# Patient Record
Sex: MALE | Race: ASIAN
[De-identification: names, ages, dates, MRNs, and addresses within clinical notes are randomized per-mention and may not be internally consistent; named-entity substitution may affect disease eponyms.]

---

## 2019-09-12 ENCOUNTER — HOSPITAL ENCOUNTER (EMERGENCY)
Dept: HOSPITAL 72 - EMR | Age: 79
Discharge: HOME | End: 2019-09-12
Payer: COMMERCIAL

## 2019-09-12 VITALS — WEIGHT: 135 LBS | HEIGHT: 65 IN | BODY MASS INDEX: 22.49 KG/M2

## 2019-09-12 VITALS — DIASTOLIC BLOOD PRESSURE: 78 MMHG | SYSTOLIC BLOOD PRESSURE: 136 MMHG

## 2019-09-12 VITALS — SYSTOLIC BLOOD PRESSURE: 136 MMHG | DIASTOLIC BLOOD PRESSURE: 78 MMHG

## 2019-09-12 DIAGNOSIS — R33.8: Primary | ICD-10-CM

## 2019-09-12 DIAGNOSIS — I10: ICD-10-CM

## 2019-09-12 DIAGNOSIS — N40.1: ICD-10-CM

## 2019-09-12 PROCEDURE — 51702 INSERT TEMP BLADDER CATH: CPT

## 2019-09-12 PROCEDURE — 99284 EMERGENCY DEPT VISIT MOD MDM: CPT

## 2019-09-12 NOTE — EMERGENCY ROOM REPORT
History of Present Illness


General


Chief Complaint:  Male Urogenital Problems


Source:  Patient, Family Member





Present Illness


HPI


79-year-old male presents ED for evaluation.  States he has been unable to 

urinate since last night.  Denies any abdominal pain.  Denies flank pain.  

History of BPH.  States he had a Marquez catheter which was removed last week by 

his PMD.  States he is compliant with his medications.  No other aggravating 

relieving factors.  Denies any other associated symptoms


Allergies:  


Coded Allergies:  


     No Known Allergies (Unverified , 9/12/19)





Patient History


Past Medical History:  HTN, other - BPH


Past Surgical History:  none


Pertinent Family History:  none


Social History:  Denies: smoking, alcohol use, drug use


Immunizations:  UTD


Reviewed Nursing Documentation:  PMH: Agreed; PSxH: Agreed





Nursing Documentation-PMH


Hx Hypertension:  Yes





Review of Systems


All Other Systems:  negative except mentioned in HPI





Physical Exam





Vital Signs








  Date Time  Temp Pulse Resp B/P (MAP) Pulse Ox O2 Delivery O2 Flow Rate FiO2


 


9/12/19 04:22 97.7 67 16 153/73 (99) 96 Room Air  








Sp02 EP Interpretation:  reviewed, normal


General Appearance:  no apparent distress, alert, GCS 15, non-toxic


Head:  normocephalic


Eyes:  bilateral eye normal inspection, bilateral eye PERRL


ENT:  normal ENT inspection


Neck:  normal inspection


Respiratory:  chest non-tender, lungs clear, normal breath sounds, speaking 

full sentences


Cardiovascular #1:  regular rate, rhythm, no edema


Gastrointestinal:  normal bowel sounds, non tender, soft, non-distended, no 

guarding, no rebound


Rectal:  deferred


Genitourinary:  no CVA tenderness


Musculoskeletal:  normal inspection


Neurologic:  alert, oriented x3, responsive, motor strength/tone normal, 

sensory intact, speech normal


Psychiatric:  normal inspection


Skin:  no rash


Lymphatic:  normal inspection





Medical Decision Making


Diagnostic Impression:  


 Primary Impression:  


 Urinary retention


ER Course


Hospital Course 


79-year-old M presents to ED complaining of urinary retention.  h/o BPH





Differential diagnoses include: obstruction, UTI, BPH 





Clinical course


Patient placed on stretcher.  After initial history and physical I ordered 

marquez cather with immediate relief of obstruction 





On reassessment patient feels better.  Will discharge to home.  States he will 

take his medications as prescribed.  States he will follow-up with his 

urologist.





Diagnosis - urinary retention 





Stable and discharged home with marquez + leg bag.  Instructed to followup with 

PMD/urologist.  Return to ED if symptoms recur or worsen





Last Vital Signs








  Date Time  Temp Pulse Resp B/P (MAP) Pulse Ox O2 Delivery O2 Flow Rate FiO2


 


9/12/19 05:10 97.7 62 16 136/78 96 Room Air  








Status:  improved


Disposition:  HOME, SELF-CARE


Condition:  Stable


Referrals:  


Jeffery Dorsey MD, Sameer M.D.











NON PHYSICIAN (PCP)


Patient Instructions:  Marquez Catheter Care, Adult, Easy-to-Read











Broderick Li MD Sep 12, 2019 06:49

## 2019-09-12 NOTE — NUR
ED Nurse Note:



marquez cath 16 fr inserted per ERMD order via sterile technique, pt tolerated 
well, 10cc ns inserted. 30 cc yellow urine returned.

## 2019-09-12 NOTE — NUR
ED Nurse Note:







pt cleared to be d/c per ERMD, pt discharge and aftercare instruction provided, 
pt provided w/ extra leg urinary bag, pt advised to follow up with pcp or 
return to ed if changes in condition, vss, ambulatory w/ steady gait, left w/ 
all belongings accompanied by spouse member, marquez intact, education done.

## 2019-09-12 NOTE — NUR
ED Nurse Note:





pt walked in c/o difficulty urination since last night, pt reports he has hx 
BPH, pt reports last time he had same issue he went to [facility] and had catether for one week. ERMD notified. noted tenderness over 
lower abd.

## 2020-01-04 ENCOUNTER — HOSPITAL ENCOUNTER (EMERGENCY)
Dept: HOSPITAL 72 - EMR | Age: 80
Discharge: HOME | End: 2020-01-04
Payer: MEDICARE

## 2020-01-04 VITALS — HEIGHT: 61 IN | WEIGHT: 134 LBS | BODY MASS INDEX: 25.3 KG/M2

## 2020-01-04 VITALS — SYSTOLIC BLOOD PRESSURE: 176 MMHG | DIASTOLIC BLOOD PRESSURE: 74 MMHG

## 2020-01-04 VITALS — DIASTOLIC BLOOD PRESSURE: 74 MMHG | SYSTOLIC BLOOD PRESSURE: 176 MMHG

## 2020-01-04 DIAGNOSIS — R33.9: Primary | ICD-10-CM

## 2020-01-04 DIAGNOSIS — I10: ICD-10-CM

## 2020-01-04 PROCEDURE — 51702 INSERT TEMP BLADDER CATH: CPT

## 2020-01-04 PROCEDURE — 99284 EMERGENCY DEPT VISIT MOD MDM: CPT

## 2020-01-04 PROCEDURE — 99282 EMERGENCY DEPT VISIT SF MDM: CPT

## 2020-01-04 NOTE — EMERGENCY ROOM REPORT
History of Present Illness


General


Chief Complaint:  Male Urogenital Problems


Source:  Patient, Medical Record





Present Illness


HPI


This is a 79-year-old Thai speaking male with a history of high blood 

pressure and BPH.  He presents with complaint of urinary retention.  Onset for 

several hours now.  Unable to urinate.  Very painful.  Very distended.  Similar 

symptom last year.  He denies any fever chills.  No nausea no vomiting.  

Nothing made it better.  Palpation and try to urinate made it worse.


Allergies:  


Coded Allergies:  


     No Known Allergies (Unverified , 9/12/19)





Patient History


Past Medical History:  see triage record, old chart reviewed, HTN


Past Surgical History:  other


Pertinent Family History:  none


Social History:  Denies: smoking


Immunizations:  other


Reviewed Nursing Documentation:  PMH: Agreed; PSxH: Agreed





Nursing Documentation-PMH


Past Medical History:  No Stated History


Hx Hypertension:  Yes





Review of Systems


Eye:  Denies: eye pain, blurred vision


ENT:  Denies: ear pain, nose congestion, throat swelling


Respiratory:  Denies: cough, shortness of breath


Cardiovascular:  Denies: chest pain, palpitations


Gastrointestinal:  Denies: abdominal pain, diarrhea, nausea, vomiting


Genitourinary:  Reports: retention


Musculoskeletal:  Denies: back pain, joint pain


Skin:  Denies: rash


Neurological:  Denies: headache, numbness


Endocrine:  Denies: increased thirst, increased urine


Hematologic/Lymphatic:  Denies: easy bruising


All Other Systems:  negative except mentioned in HPI





Physical Exam





Vital Signs








  Date Time  Temp Pulse Resp B/P (MAP) Pulse Ox O2 Delivery O2 Flow Rate FiO2


 


1/4/20 03:39 98.1 61 18 176/74 (108) 98 Room Air  





Vitals with high blood pressure


Sp02 EP Interpretation:  reviewed, normal


General Appearance:  well appearing, no apparent distress, alert


Head:  normocephalic, atraumatic


Eyes:  bilateral eye PERRL, bilateral eye EOMI


ENT:  hearing grossly normal, normal pharynx


Neck:  full range of motion, supple, no meningismus


Respiratory:  chest non-tender, lungs clear, normal breath sounds


Cardiovascular #1:  regular rate, rhythm, no murmur


Gastrointestinal:  normal bowel sounds, non tender, no mass, no organomegaly, 

no bruit, non-distended, other - Distended bladder


Musculoskeletal:  back normal, normal range of motion, gait/station normal


Psychiatric:  mood/affect normal





Medical Decision Making


Diagnostic Impression:  


 Primary Impression:  


 Urinary retention


ER Course


Resents with acute urinary retention.  Blood pressure better after Robles 

placed.  Output about 650 cc





Last Vital Signs








  Date Time  Temp Pulse Resp B/P (MAP) Pulse Ox O2 Delivery O2 Flow Rate FiO2


 


1/4/20 03:39 98.1 61 18 176/74 (108) 98 Room Air  








Status:  improved


Disposition:  HOME, SELF-CARE


Condition:  Stable


Scripts


Tamsulosin HCl (Flomax) 0.4 Mg Cap.er.24h


0.4 MG ORAL DAILY, #30 CAP


   Prov: oDmenico Silva MD         1/4/20





Additional Instructions:  


Follow-up with your doctor in 2 to 3 days for recheck.  Return if worse.











Domenico Silva MD Jan 4, 2020 03:52

## 2020-01-04 NOTE — NUR
ED Nurse Note:



Patient  walked in to Er with his wife, due to unable to urinate. States 
started 2-3 hrs ago.Patient AAO x4, VSS at this time, skin is warm to touch.